# Patient Record
(demographics unavailable — no encounter records)

---

## 2024-12-06 NOTE — HISTORY OF PRESENT ILLNESS
[FreeTextEntry1] : Dec 6 2024  did well until 3 weeks ago walking pneumonia with CXR: given Zithromax and prednisolone  totally recovered in 1 week  now totally fine  No other incidences of  hospitalization, emergency department, urgent care, unscheduled PMD visit for asthma, no systemic steroid, no absence from school// parents take leave because of pt asthma.  symptoms are          controlled by RULES OF TWO's [Snoring] : snoring [Fever] : fever [Sweating Heavily At Night] : night sweats [Nonspecific Pain, Swelling, And Stiffness] : pain [Feelings Of Weakness On Exertion] : exercise intolerance [Coughing Up Sputum] : sputum production [Coughing Up Blood (Hemoptysis)] : hemoptysis

## 2024-12-06 NOTE — ASSESSMENT
[FreeTextEntry1] : Problems and Plans ### persistent asthma: daily controller ### exercise asthma : albuterol prior ### allergic rhinitis: nasal steroid Rx, 3 rd gen antihistamine ### eczema: topical steroid prn  discuss asthma management plan for high risk season, controller adjustment ,  discuss exacerbation  recognition,  guardian expressed understanding.  discussed rx for exercise induced asthma Influenza vaccine and recommended vaccines recommended .Discussed trigger and environmental control,  Action/ plan discussed with verbal understanding, (please see patient discussion, assessment  sections and patient  education above )   total time spent  30 minutes

## 2024-12-06 NOTE — PHYSICAL EXAM
[Well Nourished] : well nourished [Well Developed] : well developed [Alert] : ~L alert [Active] : active [Normal Breathing Pattern] : normal breathing pattern [No Respiratory Distress] : no respiratory distress [No Drainage] : no drainage [No Conjunctivitis] : no conjunctivitis [Tympanic Membranes Clear] : tympanic membranes were clear [No Nasal Drainage] : no nasal drainage [No Polyps] : no polyps [No Sinus Tenderness] : no sinus tenderness [No Oral Pallor] : no oral pallor [No Oral Cyanosis] : no oral cyanosis [Non-Erythematous] : non-erythematous [No Exudates] : no exudates [No Postnasal Drip] : no postnasal drip [No Tonsillar Enlargement] : no tonsillar enlargement [Absence Of Retractions] : absence of retractions [Symmetric] : symmetric [Good Expansion] : good expansion [No Acc Muscle Use] : no accessory muscle use [Good aeration to bases] : good aeration to bases [Equal Breath Sounds] : equal breath sounds bilaterally [No Crackles] : no crackles [No Rhonchi] : no rhonchi [No Wheezing] : no wheezing [Normal Sinus Rhythm] : normal sinus rhythm [No Heart Murmur] : no heart murmur [Soft, Non-Tender] : soft, non-tender [No Hepatosplenomegaly] : no hepatosplenomegaly [Non Distended] : was not ~L distended [Abdomen Mass (___ Cm)] : no abdominal mass palpated [Full ROM] : full range of motion [No Clubbing] : no clubbing [Capillary Refill < 2 secs] : capillary refill less than two seconds [No Cyanosis] : no cyanosis [No Petechiae] : no petechiae [No Kyphoscoliosis] : no kyphoscoliosis [No Contractures] : no contractures [Alert and  Oriented] : alert and oriented [No Abnormal Focal Findings] : no abnormal focal findings [Normal Muscle Tone And Reflexes] : normal muscle tone and reflexes [No Birth Marks] : no birth marks [No Rashes] : no rashes [No Skin Lesions] : no skin lesions [FreeTextEntry1] : looks well, no distress, normal voice/cry, no stridor, no clubbing by Schamroth and phalangeal depth ratio and angles [FreeTextEntry2] :  , observed nasal mucosal edema and allergic shiners

## 2025-04-16 NOTE — HISTORY OF PRESENT ILLNESS
[FreeTextEntry1] : Had two flares up In Nov ERV given 5 days of prednisolone  In Feb given nebulizer but no steroid  since then  No hospitalization, emergency department, urgent care, unscheduled PMD visit for asthma, no systemic steroid, no absence from school// parents take leave because of pt asthma.  symptoms are          controlled by RULES OF TWO's

## 2025-04-16 NOTE — ASSESSMENT
[FreeTextEntry1] : spirometry is performed to assess the patient for progress/ evaluation  of baseline asthma (per national asthma management guidelines) result: normal /  exhaled nitrous oxide is performed to assess allergy/ inflammation  result:   <20         (   normal <20, 20-35 likely TH2 driven inflammation >35 significant Th2   driven inflammation ) d/w guardian above results continue to monitor progress continue treatment plan  Problems and Plans ### persistent asthma: daily controller ### exercise asthma : albuterol prior ### allergic rhinitis: nasal steroid Rx, 3 rd gen antihistamine ### eczema: topical steroid prn   taught to monitor  symptoms especially cough, tightness, wheeze and SOB when active , laughing ,  strong emotion such as crying, running, exposed to cold air and at night taught to use symptom diary  d/w rules of twos   d/w methods of  weaning ics d/w when to start full dose ICS

## 2025-04-16 NOTE — CONSULT LETTER
[Dear  ___] : Dear  [unfilled], [Consult Letter:] : I had the pleasure of evaluating your patient, [unfilled]. [Please see my note below.] : Please see my note below. [Sincerely,] : Sincerely, [FreeTextEntry3] : Guardians are asked to contact us if there is any concern or questions. The next appointment will be in  8 to 14 weeks Thank you for your referral and the opportunity to contribute to the care of this yudith family. Please feel free to contact me at any time, if I may be of further assistance.  Deya ZURITA FCCP Director of Pediatric Pulmonology Diplomate, Pediatric Pulmonology, American Board of Pediatrics Diplomate, Sleep medicine, American Board of  Pediatrics [facility]